# Patient Record
Sex: FEMALE | Race: WHITE
[De-identification: names, ages, dates, MRNs, and addresses within clinical notes are randomized per-mention and may not be internally consistent; named-entity substitution may affect disease eponyms.]

---

## 2020-02-13 ENCOUNTER — HOSPITAL ENCOUNTER (OUTPATIENT)
Dept: HOSPITAL 88 - OR | Age: 55
Discharge: HOME | End: 2020-02-13
Attending: INTERNAL MEDICINE
Payer: COMMERCIAL

## 2020-02-13 VITALS — DIASTOLIC BLOOD PRESSURE: 92 MMHG | SYSTOLIC BLOOD PRESSURE: 127 MMHG

## 2020-02-13 DIAGNOSIS — K90.89: ICD-10-CM

## 2020-02-13 DIAGNOSIS — Z87.891: ICD-10-CM

## 2020-02-13 DIAGNOSIS — E66.9: ICD-10-CM

## 2020-02-13 DIAGNOSIS — D12.5: ICD-10-CM

## 2020-02-13 DIAGNOSIS — D12.4: ICD-10-CM

## 2020-02-13 DIAGNOSIS — Z01.810: ICD-10-CM

## 2020-02-13 DIAGNOSIS — K52.9: Primary | ICD-10-CM

## 2020-02-13 DIAGNOSIS — K62.1: ICD-10-CM

## 2020-02-13 PROCEDURE — 45378 DIAGNOSTIC COLONOSCOPY: CPT

## 2020-02-13 PROCEDURE — 45385 COLONOSCOPY W/LESION REMOVAL: CPT

## 2020-02-13 PROCEDURE — 81025 URINE PREGNANCY TEST: CPT

## 2020-02-13 PROCEDURE — 45380 COLONOSCOPY AND BIOPSY: CPT

## 2020-02-13 PROCEDURE — 93005 ELECTROCARDIOGRAM TRACING: CPT

## 2020-02-13 NOTE — XMS REPORT
Patient Summary Document

                             Created on: 2020



SERAFIN MATA

External Reference #: [id number]

: 1965

Sex: Female



Demographics







                          Address                   [address] [place] [address]  [address]

 

                          Home Phone                [phone]

 

                          Preferred Language        Unknown

 

                          Marital Status            Unknown

 

                          Confucianist Affiliation     Unknown

 

                          Race                      Unknown

 

                          Ethnic Group              Unknown





Author







                          Author                    [facility]

 

                          Address                   Unknown

 

                          Phone                     Unavailable







Care Team Providers







                    Care Team Member Name    Role                Phone

 

                          Unavailable               Unavailable







Problems

This patient has no known problems.



Allergies, Adverse Reactions, Alerts

This patient has no known allergies or adverse reactions.



Medications

This patient has no known medications.



Encounters







             Start Date/Time    End Date/Time    Encounter Type    Admission Type    Attending [facility]       Care Department     Encounter ID

 

        2018-10-28 00:00:00    2018-10-28 00:00:00    Emergency                    [facility]     [id number]

 

        2018 07:48:37    2018 07:48:37    Emergency                    [facility]     MED     [id number]

 

        2018 13:43:32    2018 13:43:32    Emergency                    [facility]     MED     [id number]